# Patient Record
Sex: MALE | Race: WHITE | NOT HISPANIC OR LATINO | Employment: UNEMPLOYED | URBAN - METROPOLITAN AREA
[De-identification: names, ages, dates, MRNs, and addresses within clinical notes are randomized per-mention and may not be internally consistent; named-entity substitution may affect disease eponyms.]

---

## 2021-01-19 ENCOUNTER — HOSPITAL ENCOUNTER (OUTPATIENT)
Dept: FAMILY MEDICINE CLINIC | Facility: CLINIC | Age: 46
Discharge: HOME | End: 2021-01-19
Attending: FAMILY MEDICINE

## 2021-01-19 ENCOUNTER — OFFICE VISIT CONVERTED (OUTPATIENT)
Dept: FAMILY MEDICINE CLINIC | Facility: CLINIC | Age: 46
End: 2021-01-19
Attending: FAMILY MEDICINE

## 2021-01-19 LAB
ALBUMIN SERPL-MCNC: 4.5 G/DL (ref 3.5–5)
ALBUMIN/GLOB SERPL: 1.7 {RATIO} (ref 1.4–2.6)
ALP SERPL-CCNC: 79 U/L (ref 53–128)
ALT SERPL-CCNC: 9 U/L (ref 10–40)
ANION GAP SERPL CALC-SCNC: 15 MMOL/L (ref 8–19)
AST SERPL-CCNC: 16 U/L (ref 15–50)
BASOPHILS # BLD AUTO: 0.07 10*3/UL (ref 0–0.2)
BASOPHILS NFR BLD AUTO: 1.5 % (ref 0–3)
BILIRUB SERPL-MCNC: 0.3 MG/DL (ref 0.2–1.3)
BUN SERPL-MCNC: 8 MG/DL (ref 5–25)
BUN/CREAT SERPL: 7 {RATIO} (ref 6–20)
CALCIUM SERPL-MCNC: 9.1 MG/DL (ref 8.7–10.4)
CHLORIDE SERPL-SCNC: 101 MMOL/L (ref 99–111)
CHOLEST SERPL-MCNC: 186 MG/DL (ref 107–200)
CHOLEST/HDLC SERPL: 3.6 {RATIO} (ref 3–6)
CONV ABS IMM GRAN: 0.01 10*3/UL (ref 0–0.2)
CONV CO2: 25 MMOL/L (ref 22–32)
CONV IMMATURE GRAN: 0.2 % (ref 0–1.8)
CONV TOTAL PROTEIN: 7.2 G/DL (ref 6.3–8.2)
CREAT UR-MCNC: 1.09 MG/DL (ref 0.7–1.2)
DEPRECATED RDW RBC AUTO: 41.2 FL (ref 35.1–43.9)
EOSINOPHIL # BLD AUTO: 0.06 10*3/UL (ref 0–0.7)
EOSINOPHIL # BLD AUTO: 1.3 % (ref 0–7)
ERYTHROCYTE [DISTWIDTH] IN BLOOD BY AUTOMATED COUNT: 12.4 % (ref 11.6–14.4)
FOLATE SERPL-MCNC: 5.2 NG/ML (ref 4.8–20)
GFR SERPLBLD BASED ON 1.73 SQ M-ARVRAT: >60 ML/MIN/{1.73_M2}
GLOBULIN UR ELPH-MCNC: 2.7 G/DL (ref 2–3.5)
GLUCOSE SERPL-MCNC: 89 MG/DL (ref 70–99)
HCT VFR BLD AUTO: 44.9 % (ref 42–52)
HDLC SERPL-MCNC: 52 MG/DL (ref 40–60)
HGB BLD-MCNC: 15.1 G/DL (ref 14–18)
LDLC SERPL CALC-MCNC: 115 MG/DL (ref 70–100)
LYMPHOCYTES # BLD AUTO: 1.2 10*3/UL (ref 1–5)
LYMPHOCYTES NFR BLD AUTO: 26.2 % (ref 20–45)
MCH RBC QN AUTO: 30.4 PG (ref 27–31)
MCHC RBC AUTO-ENTMCNC: 33.6 G/DL (ref 33–37)
MCV RBC AUTO: 90.5 FL (ref 80–96)
MONOCYTES # BLD AUTO: 0.31 10*3/UL (ref 0.2–1.2)
MONOCYTES NFR BLD AUTO: 6.8 % (ref 3–10)
NEUTROPHILS # BLD AUTO: 2.93 10*3/UL (ref 2–8)
NEUTROPHILS NFR BLD AUTO: 64 % (ref 30–85)
NRBC CBCN: 0 % (ref 0–0.7)
OSMOLALITY SERPL CALC.SUM OF ELEC: 282 MOSM/KG (ref 273–304)
PLATELET # BLD AUTO: 232 10*3/UL (ref 130–400)
PMV BLD AUTO: 9.4 FL (ref 9.4–12.4)
POTASSIUM SERPL-SCNC: 4.4 MMOL/L (ref 3.5–5.3)
RBC # BLD AUTO: 4.96 10*6/UL (ref 4.7–6.1)
SODIUM SERPL-SCNC: 137 MMOL/L (ref 135–147)
T4 FREE SERPL-MCNC: 1.2 NG/DL (ref 0.9–1.8)
TRIGL SERPL-MCNC: 97 MG/DL (ref 40–150)
TSH SERPL-ACNC: 1.03 M[IU]/L (ref 0.27–4.2)
VIT B12 SERPL-MCNC: 265 PG/ML (ref 211–911)
VLDLC SERPL-MCNC: 19 MG/DL (ref 5–37)
WBC # BLD AUTO: 4.58 10*3/UL (ref 4.8–10.8)

## 2021-02-02 ENCOUNTER — OFFICE VISIT CONVERTED (OUTPATIENT)
Dept: OTOLARYNGOLOGY | Facility: CLINIC | Age: 46
End: 2021-02-02
Attending: NURSE PRACTITIONER

## 2021-02-04 ENCOUNTER — HOSPITAL ENCOUNTER (OUTPATIENT)
Dept: MRI IMAGING | Facility: HOSPITAL | Age: 46
Discharge: HOME | End: 2021-02-04
Attending: FAMILY MEDICINE

## 2021-02-09 ENCOUNTER — OFFICE VISIT CONVERTED (OUTPATIENT)
Dept: FAMILY MEDICINE CLINIC | Facility: CLINIC | Age: 46
End: 2021-02-09
Attending: FAMILY MEDICINE

## 2021-02-09 ENCOUNTER — HOSPITAL ENCOUNTER (OUTPATIENT)
Dept: FAMILY MEDICINE CLINIC | Facility: CLINIC | Age: 46
Discharge: HOME | End: 2021-02-09
Attending: FAMILY MEDICINE

## 2021-02-09 LAB
25(OH)D3 SERPL-MCNC: 12.2 NG/ML (ref 30–100)
ANION GAP SERPL CALC-SCNC: 14 MMOL/L (ref 8–19)
BASOPHILS # BLD AUTO: 0.1 10*3/UL (ref 0–0.2)
BASOPHILS NFR BLD AUTO: 1.6 % (ref 0–3)
BUN SERPL-MCNC: 6 MG/DL (ref 5–25)
BUN/CREAT SERPL: 6 {RATIO} (ref 6–20)
CALCIUM SERPL-MCNC: 8.9 MG/DL (ref 8.7–10.4)
CHLORIDE SERPL-SCNC: 103 MMOL/L (ref 99–111)
CONV ABS IMM GRAN: 0.01 10*3/UL (ref 0–0.2)
CONV CO2: 24 MMOL/L (ref 22–32)
CONV IMMATURE GRAN: 0.2 % (ref 0–1.8)
CREAT UR-MCNC: 1 MG/DL (ref 0.7–1.2)
DEPRECATED RDW RBC AUTO: 40.5 FL (ref 35.1–43.9)
EOSINOPHIL # BLD AUTO: 0.07 10*3/UL (ref 0–0.7)
EOSINOPHIL # BLD AUTO: 1.1 % (ref 0–7)
ERYTHROCYTE [DISTWIDTH] IN BLOOD BY AUTOMATED COUNT: 12.2 % (ref 11.6–14.4)
GFR SERPLBLD BASED ON 1.73 SQ M-ARVRAT: >60 ML/MIN/{1.73_M2}
GLUCOSE SERPL-MCNC: 99 MG/DL (ref 70–99)
HCT VFR BLD AUTO: 43.3 % (ref 42–52)
HGB BLD-MCNC: 14.9 G/DL (ref 14–18)
LYMPHOCYTES # BLD AUTO: 2.07 10*3/UL (ref 1–5)
LYMPHOCYTES NFR BLD AUTO: 33.3 % (ref 20–45)
MAGNESIUM SERPL-MCNC: 1.96 MG/DL (ref 1.6–2.3)
MCH RBC QN AUTO: 30.8 PG (ref 27–31)
MCHC RBC AUTO-ENTMCNC: 34.4 G/DL (ref 33–37)
MCV RBC AUTO: 89.5 FL (ref 80–96)
MONOCYTES # BLD AUTO: 0.28 10*3/UL (ref 0.2–1.2)
MONOCYTES NFR BLD AUTO: 4.5 % (ref 3–10)
NEUTROPHILS # BLD AUTO: 3.69 10*3/UL (ref 2–8)
NEUTROPHILS NFR BLD AUTO: 59.3 % (ref 30–85)
NRBC CBCN: 0 % (ref 0–0.7)
OSMOLALITY SERPL CALC.SUM OF ELEC: 282 MOSM/KG (ref 273–304)
PLATELET # BLD AUTO: 241 10*3/UL (ref 130–400)
PMV BLD AUTO: 9.2 FL (ref 9.4–12.4)
POTASSIUM SERPL-SCNC: 3.9 MMOL/L (ref 3.5–5.3)
RBC # BLD AUTO: 4.84 10*6/UL (ref 4.7–6.1)
SODIUM SERPL-SCNC: 137 MMOL/L (ref 135–147)
WBC # BLD AUTO: 6.22 10*3/UL (ref 4.8–10.8)

## 2021-02-25 ENCOUNTER — OFFICE VISIT CONVERTED (OUTPATIENT)
Dept: OTOLARYNGOLOGY | Facility: CLINIC | Age: 46
End: 2021-02-25
Attending: NURSE PRACTITIONER

## 2021-05-07 NOTE — PROGRESS NOTES
Progress Note      Patient Name: Alex Vázquez   Patient ID: [id number]   Sex: Male   YOB: 1975        Visit Date: February 9, 2021    Provider: Colin Dawkins MD   Location: [facility]   Location Address: [address] [place] [address] [place]  [address]   Location Phone: [phone]          Chief Complaint     mri follow up       History Of Present Illness  Alex Vázquez is a 45 year old /White male who presents for evaluation and treatment of:      pt is still having similar symptoms as previous visit- he has dizziness and vision changes  discussed labs  low vitamin B12  leukopenia  discussed MRI brain- small vessel disease only  pt did not get along with [facility] and he would like to start conservative treatment for low back pain  strongly urged pt again to stop smoking- pt says he smokes 1.5ppd       Past Medical History  Disease Name Date Onset Notes   Backache --  --          Past Surgical History  Procedure Name Date Notes   *No Past Surgical History --  --          Allergy List  Allergen Name Date Reaction Notes   NO KNOWN DRUG ALLERGIES --  --  --          Family Medical History  Disease Name Relative/Age Notes   *Adopted  --          Social History  Finding Status Start/Stop Quantity Notes   Alcohol Never --/-- --  --    Tobacco Current every day --/-- --  2 PKS DAY         Review of Systems  · Constitutional  o Denies  o : fatigue, fever  · HENT  o Admits  o : vertigo  · Cardiovascular  o Denies  o : chest pain, palpitations  · Respiratory  o Denies  o : shortness of breath, cough  · Musculoskeletal  o Admits  o : back pain      Vitals  Date Time BP Position Site L\R Cuff Size HR RR TEMP (F) WT  HT  BMI kg/m2 BSA m2 O2 Sat FR L/min FiO2 HC       02/09/2021 04:22 /70 Sitting    71 - R  96.8 146lbs 0oz    99 %            Physical Examination  · Constitutional  o Appearance  o : well developed, well-nourished, in no acute  distress  · Respiratory  o Respiratory Effort  o : breathing unlabored  o Auscultation of Lungs  o : clear to ascultation  · Cardiovascular  o Heart  o :   § Auscultation of Heart  § : regular rate and rhythm  o Peripheral Vascular System  o :   § Extremities  § : no edema  · Musculoskeletal  o General  o :   § General Musculoskeletal  § : No joint swelling or deformity., Muscle tone, strength, and development grossly normal. Bilateral lower back paraspinal muscle tenderness, neg straight leg raise, no vertebrae tenderness  · Neurologic  o Gait and Station  o :   § Gait Screening  § : normal gait  · Psychiatric  o Mood and Affect  o : mood normal, affect appropriate          Assessment  · Vitamin B12 deficiency     266.2/E53.8  · Hyperlipidemia     272.4/E78.5  · Low back pain     724.2/M54.5  · Dizziness     780.4/R42  · Vision changes     368.9/H53.9  · Leukopenia     288.50/D72.819  · Muscle spasm     728.85/M62.838  · Bone pain     733.90/M89.8X9      Plan  · Orders  o BMP [facility] (98030) - 728.85/M62.838 - 02/09/2021  o CBC with Auto Diff H (11240) - 288.50/D72.819 - 02/09/2021  o Vitamin D (25-Hydroxy) Level (94545) - 733.90/M89.8X9 - 02/09/2021  o ACO-39: Current medications updated and reviewed (1159F, ) - - 02/09/2021  o PHYSICAL THERAPY CONSULTATION (PHYTH) - 724.2/M54.5 - 02/09/2021  o NEUROLOGY CONSULTATION. (NEURO) - 780.4/R42, 368.9/H53.9 - 02/09/2021   no orapilla  o Magnesium level (52609) - 728.85/M62.838 - 02/09/2021  · Medications  o tizanidine 2 mg oral tablet   SIG: take 1 tablet by oral route 3 times a day as needed for 30 days   DISP: (90) Tablet with 1 refills  Prescribed on 02/09/2021     o cyanocobalamin (vitamin B-12) 1,000 mcg/mL injection solution   SIG: inject 1 milliliter (1,000 mcg) by intramuscular route once a month for 30 days   DISP: (1) Milliliter with 5 refills  Prescribed on 02/09/2021     o Medications have been Reconciled  o Transition of Care or Provider  Policy  · Instructions  o Advised that cheeses and other sources of dairy fats, animal fats, fast food, and the extras (candy, pasteries, pies, doughnuts and cookies) all contain LDL raising nutrients. Advised to increase fruits, vegetables, whole grains, and to monitor portion sizes.   o Patient was educated/instructed on their diagnosis, treatment and medications prior to discharge from the clinic today.            Electronically Signed by: Colin Dawkins MD -Author on February 9, 2021 05:00:10 PM

## 2021-05-07 NOTE — PROGRESS NOTES
Progress Note      Patient Name: Alex Vázquez   Patient ID: [id number]   Sex: Male   YOB: 1975        Visit Date: January 19, 2021    Provider: Colin Dawkins MD   Location: [facility] [place]   Location Address: [address] [place] [address] [place]  [address]   Location Phone: [phone]          Chief Complaint     New patient established. Back pain x two weeks. Hx of a back injury two years ago. Pt also c/o vision being blurry for two weeks. Also has chronic hearing issues he would like to discuss.       History Of Present Illness  Alex Vázquez is a 45 year old /White male who presents for evaluation and treatment of:      pt has chronic vertigo for multiple years and has dizziness that is becoming more frequent    pt has had worsening blurry vision over last 2 weeks- double vision intermittently    intermittent numbness in back of head x 2 weeks    chronic low back pain since it was crushed during accident at home fell down and hurt back 3 yrs ago- had brace for awhile- no bowel or bladder incontinence- pt had seen neurosurgeon at that time- was in brace for long period of time- no surgery- pain worsening- pt unable to  things off of table    pt urged to quit smoking    xrays:no fxs       Past Medical History  Disease Name Date Onset Notes   Backache --  --          Past Surgical History  Procedure Name Date Notes   *No Past Surgical History --  --          Allergy List  Allergen Name Date Reaction Notes   NO KNOWN DRUG ALLERGIES --  --  --          Family Medical History  Disease Name Relative/Age Notes   *Adopted  --          Social History  Finding Status Start/Stop Quantity Notes   Alcohol Never --/-- --  --    Tobacco Current every day --/-- --  2 PKS DAY         Review of Systems  · Constitutional  o Denies  o : fatigue, fever  · Eyes  o Admits  o : double vision, blurred vision  · Cardiovascular  o Denies  o : chest pain,  "palpitations  · Respiratory  o Denies  o : shortness of breath, cough  · Gastrointestinal  o Denies  o : nausea, vomiting, diarrhea  · Musculoskeletal  o Admits  o : back pain  · Psychiatric  o Denies  o : anxiety, depression      Vitals  Date Time BP Position Site L\R Cuff Size HR RR TEMP (F) WT  HT  BMI kg/m2 BSA m2 O2 Sat FR L/min FiO2 HC       01/19/2021 10:43 /60 Sitting    79 - R  96.8 145lbs 0oz 5'  8.5\" 21.73 1.78 96 %            Physical Examination  · Constitutional  o Appearance  o : well developed, well-nourished, in no acute distress  · Eyes  o Conjunctivae  o : conjunctivae normal  o Pupils and Irises  o : PERRLA, EOMI bilaterally  · Respiratory  o Respiratory Effort  o : breathing unlabored  o Auscultation of Lungs  o : clear to ascultation  · Cardiovascular  o Heart  o :   § Auscultation of Heart  § : regular rate and rhythm  o Peripheral Vascular System  o :   § Extremities  § : no edema  · Gastrointestinal  o Abdomen  o : soft, non-tender, non-distended, + bowel sounds, no hepatosplenomegaly, no masses palpated  · Musculoskeletal  o General  o :   § General Musculoskeletal  § : No joint swelling or deformity., Muscle tone, strength, and development grossly normal. Right lower back paraspinal muscle tenderness, neg straight leg raise, no vertebrae tenderness.  · Neurologic  o Gait and Station  o :   § Gait Screening  § : normal gait  · Psychiatric  o Mood and Affect  o : mood normal, affect appropriate          Assessment  · Vision blurring     368.8/H53.8  · Vertigo     780.4/R42  · Dizziness     780.4/R42  · Vision changes     368.9/H53.9  · Double vision     368.2/H53.2  · Numbness     782.0/R20.0  · Chronic low back pain       Low back pain     724.2/M54.5  Other chronic pain     724.2/G89.29  · Screening cholesterol level     V77.91/Z13.220      Plan  · Orders  o CBC with Auto Diff [facility] (09913) - 780.4/R42 - 01/19/2021  o CMP [facility] (43257) - 780.4/R42 - 01/19/2021  o Lipid Panel [facility] (90647) " - V77.91/Z13.220 - 01/20/2021  o Thyroid Profile (THYII) - 780.4/R42, 368.9/H53.9, 782.0/R20.0 - 01/19/2021  o ACO-39: Current medications updated and reviewed (1159F, ) - - 01/19/2021  o Vision screening (68849) - 368.8/H53.8 - 01/19/2021   B 20/20 LT 20/20 RT 20/40  o ENT CONSULTATION (ENTCO) - 780.4/R42 - 01/19/2021  o Bilateral duplex scan of carotid arteries (41536) - 780.4/R42 - 01/19/2021  o OPHTHALMOLOGY CONSULTATION (OPHTH) - 368.8/H53.8, 368.9/H53.9, 368.2/H53.2 - 01/19/2021  o MRI brain multi-sequence wo then w contrast (38731) - 780.4/R42, 368.9/H53.9, 368.2/H53.2, 782.0/R20.0 - 01/19/2021  o Vitamin B12 and folate measurement (90922, 17297) - 782.0/R20.0 - 01/19/2021  o NEUROSURGEON CONSULTATION (NEUSR) - 724.2/M54.5, 724.2/G89.29 - 01/19/2021   pt desiring to see [facility]  o Lumbar Spine AP and Lateral X-Ray [facility] (00036) - 724.2/M54.5, 724.2/G89.29 - 01/19/2021  · Medications  o Medications have been Reconciled  o Transition of Care or Provider Policy  · Instructions  o Patient was educated/instructed on their diagnosis, treatment and medications prior to discharge from the clinic today.            Electronically Signed by: Colin Dawkins MD -Author on January 19, 2021 11:56:50 AM

## 2021-05-09 VITALS
TEMPERATURE: 96.8 F | OXYGEN SATURATION: 96 % | WEIGHT: 145 LBS | DIASTOLIC BLOOD PRESSURE: 60 MMHG | HEIGHT: 68 IN | HEART RATE: 79 BPM | BODY MASS INDEX: 21.98 KG/M2 | SYSTOLIC BLOOD PRESSURE: 100 MMHG

## 2021-05-09 VITALS
HEART RATE: 71 BPM | WEIGHT: 146 LBS | OXYGEN SATURATION: 99 % | SYSTOLIC BLOOD PRESSURE: 116 MMHG | DIASTOLIC BLOOD PRESSURE: 70 MMHG | TEMPERATURE: 96.8 F

## 2021-05-10 NOTE — H&P
"   History and Physical      Patient Name: Alex Vázquez   Patient ID: [id number]   Sex: Male   YOB: 1975    Primary Care Provider: Colin Dawkins MD   Referring Provider: Colin Dawkins MD    Visit Date: February 2, 2021    Provider: RITU Khan   Location: [facility]   Location Address: [address] [place] [address], [address] [place] [address]  [id number]   Location Phone: [phone]          Chief Complaint     1. Vertigo    2. Hearing loss       History Of Present Illness  Alex Vázquez is a 45 year old /White male who presents to the office today as a consult from Colin Dawkins MD.      He presents to the clinic today for evaluation of vertigo and hearing loss.  He reports for many years he has had issues with vertigo.  He reports that he will go several days with having \"episodes\" of room spinning sensation, nausea and vomiting.  He states he will get a headache in the back of his head, a numbness sensation in the back of his head as well prior to having vertigo.  He states that he has issues with his spine and is scheduled to have an MRI in 2 days to further evaluate this.  He reports that typically the vertigo only lasts a few minutes and then resolves but will often return.  It is most always triggered by movement.  He states he has had some severe episodes while driving in the car and has had to pull over.  He states that he was previously seen a few years ago by an ENT in [place] who wanted to send him for vestibular testing.  He states that he did not go for this testing.  He reports that he has also had hearing loss for many years.  He feels like his hearing is worsening.  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  He has not had any issues with recurrent ear infections.       Past Medical History  Compression fracture of L2 lumbar vertebra; Decreased hearing; Dizziness         Past Surgical History  *No Past Surgical History         Allergy List  NO KNOWN " "DRUG ALLERGIES         Family Medical History  *No Known Family History         Social History  Tobacco (Current every day)         Review of Systems  · Constitutional  o Denies  o : fever, night sweats, weight loss  · Eyes  o Denies  o : discharge from eye, impaired vision  · HENT  o Admits  o : *See HPI  · Cardiovascular  o Denies  o : chest pain, irregular heart beats  · Respiratory  o Denies  o : shortness of breath, wheezing, coughing up blood  · Gastrointestinal  o Denies  o : heartburn, reflux, vomiting blood  · Genitourinary  o Denies  o : frequency  · Integument  o Denies  o : rash, skin dryness  · Neurologic  o Admits  o : loss of balance, dizziness  o Denies  o : seizures, loss of consciousness  · Endocrine  o Denies  o : cold intolerance, heat intolerance  · Heme-Lymph  o Denies  o : easy bleeding, anemia      Vitals  Date Time BP Position Site L\R Cuff Size HR RR TEMP (F) WT  HT  BMI kg/m2 BSA m2 O2 Sat FR L/min FiO2        02/02/2021 01:22 PM        97.4 146lbs 6oz 5'  6\" 23.63 1.76             Physical Examination  · Constitutional  o Appearance  o : well developed, well-nourished, alert and in no acute distress, voice clear and strong  · Head and Face  o Head  o :   § Inspection  § : no deformities or lesions  o Face  o :   § Inspection  § : No facial lesions; House-Brackmann I/VI bilaterally  § Palpation  § : No TMJ crepitus nor  muscle tenderness bilaterally  · Eyes  o Vision  o :   § Visual Fields  § : Extraocular movements are intact. No spontaneous or gaze-induced nystagmus.  o Conjunctivae  o : clear  o Sclerae  o : clear  o Pupils and Irises  o : pupils equal, round, and reactive to light.   · Ears, Nose, Mouth and Throat  o Ears  o :   § External Ears  § : appearance within normal limits, no lesions present  § Otoscopic Examination  § : tympanic membrane appearance within normal limits bilaterally without perforations, well-aerated middle ears  § Hearing  § : difficulty hearing " conversation  o Nose  o :   § External Nose  § : appearance normal  § Intranasal Exam  § : mucosa within normal limits, vestibules normal, no intranasal lesions present, septum midline, sinuses non tender to percussion  o Oral Cavity  o :   § Oral Mucosa  § : oral mucosa normal without pallor or cyanosis  § Lips  § : lip appearance normal  § Teeth  § : normal dentition for age  § Gums  § : gums pink, non-swollen, no bleeding present  § Tongue  § : tongue appearance normal; normal mobility  § Palate  § : hard palate normal, soft palate appearance normal with symmetric mobility  o Throat  o :   § Oropharynx  § : no inflammation or lesions present, tonsils within normal limits  § Hypopharynx  § : appearance within normal limits, superior epiglottis within normal limits  § Larynx  § : appearance within normal limits, vocal cords within normal limits, no lesions present  · Neck  o Inspection/Palpation  o : normal appearance, no masses or tenderness, trachea midline; thyroid size normal, nontender, no nodules or masses present on palpation  · Respiratory  o Respiratory Effort  o : breathing unlabored  o Inspection of Chest  o : normal appearance, no retractions  · Lymphatic  o Neck  o : no lymphadenopathy present  o Supraclavicular Nodes  o : no lymphadenopathy present  o Preauricular Nodes  o : no lymphadenopathy present  · Skin and Subcutaneous Tissue  o General Inspection  o : Regarding face and neck - there are no rashes present, no lesions present, and no areas of discoloration  · Neurologic  o Cranial Nerves  o : cranial nerves II-XII are grossly intact bilaterally  o Gait and Station  o : normal gait, able to stand without difficulty. Elsy-Hallpike maneuver negative bilaterally. Tandem gait normal.  · Psychiatric  o Judgement and Insight  o : judgment and insight intact  o Mood and Affect  o : mood normal, affect appropriate          Assessment  · Vertigo     780.4/R42  · Sensorineural hearing loss (SNHL),  "bilateral     389.18/H90.3      Plan  · Orders  o Audiometry, pure-tone (threshold); [facility] bone (07106) - 780.4/R42, 389.18/H90.3 - 02/02/2021  o Tympanogram (Impedance Testing) [facility] (07289) - 780.4/R42, 389.18/H90.3 - 02/02/2021  o Vestibular testing, bilateral, bithermal [facility] (09264) - 780.4/R42, 389.18/H90.3 - 02/02/2021  · Medications  o Medications have been Reconciled  o Transition of Care or Provider Policy  · Instructions  o He presents to the clinic today for evaluation of vertigo and hearing loss. He reports for many years he has had issues with vertigo. He reports that he will go several days with having \"episodes\" of room spinning sensation, nausea and vomiting. He states he will get a headache in the back of his head, a numbness sensation in the back of his head as well prior to having vertigo. He states that he has issues with his spine and is scheduled to have an MRI in 2 days to further evaluate this. He reports that typically the vertigo only lasts a few minutes and then resolves but will often return. It is most always triggered by movement. He states he has had some severe episodes while driving in the car and has had to pull over. He states that he was previously seen a few years ago by an ENT in [place] who wanted to send him for vestibular testing. He states that he did not go for this testing. He reports that he has also had hearing loss for many years. He feels like his hearing is worsening. 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 He has not had any issues with recurrent ear infections. On examination today bilateral external auditory canals and bilateral tympanic membrane appearance is normal. There are no perforations and middle ears are well aerated. Elsy-Hallpike maneuver is negative bilaterally. His gait is steady and tandem gait is normal. We did obtain an audiogram and tympanogram. Audiogram shows the right ear with a flat moderately severe sensorineural hearing loss. The " left ear has an essentially flat moderately severe to moderate sensorineural hearing loss. Speech reception threshold was at 55 dB on the right and 50 dB on the left. Word discrimination scores were 48% on the right and 52% on the left at 85 dB. Tympanograms were normal bilaterally. I have gone over the results of the audiogram with the patient and his wife and also given them a copy. He would get some benefit from amplification we have discussed this. And concern with his vertigo we have discussed the possibility of ordering vestibular testing for further evaluation of his symptoms and he is agreeable to this. I will plan to see him back after the testing.  o Electronically Identified Patient Education Materials Provided Electronically  · Correspondence  o ENT Letter to Referring MD (Colin Dawkins MD) - 02/02/2021            Electronically Signed by: RITU Khan -Author on February 2, 2021 02:40:56 PM

## 2021-05-14 VITALS — WEIGHT: 146.37 LBS | BODY MASS INDEX: 23.52 KG/M2 | TEMPERATURE: 97.4 F | HEIGHT: 66 IN

## 2021-05-14 VITALS — WEIGHT: 146.37 LBS | HEIGHT: 66 IN | BODY MASS INDEX: 23.52 KG/M2 | TEMPERATURE: 97.3 F

## 2021-05-14 NOTE — PROGRESS NOTES
Progress Note      Patient Name: Alex Vázquez   Patient ID: [id number]   Sex: Male   YOB: 1975    Primary Care Provider: Colin Dawkins MD   Referring Provider: Colin Dawkins MD    Visit Date: February 25, 2021    Provider: RITU Khan   Location: [facility]   Location Address: [address] [place] [address], [address] [place] [address]  [id number]   Location Phone: [phone]          Chief Complaint     1.  Vertigo    2.  Hearing loss       History Of Present Illness  Alex Vázquez is a 45 year old /White male who presents to the office today for a follow-up visit.      He presents to the clinic today for follow-up regarding vertigo and hearing loss.  He recently had video nystagmography testing performed and is here for the results.  He reports that he has been doing fairly well.  He has not had any episodes of vertigo but does frequently feel off balance.  When he was last seen on 2/2/2021 an audiogram showed that the right ear had a flat moderately severe sensorineural hearing loss.  The left ear had an essentially flat moderately severe to moderate sensorineural hearing loss.  Tympanograms were normal bilaterally.  Word discrimination scores were 48% on the right and 52% on the left at 85 dB.  Results of the VNG testing show findings would suggest an older peripheral dysfunction likely of the right vestibular system.  Recommendation is vestibular therapy may be of benefit.       Past Medical History  Compression fracture of L2 lumbar vertebra; Decreased hearing; Dizziness         Past Surgical History  *No Past Surgical History         Allergy List  NO KNOWN DRUG ALLERGIES         Family Medical History  *No Known Family History         Social History  Tobacco (Current every day)         Review of Systems  · Constitutional  o Denies  o : fever, night sweats, weight loss  · Eyes  o Denies  o : discharge from eye, impaired vision  · HENT  o Admits  o : *See  "HPI  · Cardiovascular  o Denies  o : chest pain, irregular heart beats  · Respiratory  o Denies  o : shortness of breath, wheezing, coughing up blood  · Gastrointestinal  o Denies  o : heartburn, reflux, vomiting blood  · Genitourinary  o Denies  o : frequency  · Integument  o Denies  o : rash, skin dryness  · Neurologic  o Denies  o : seizures, loss of balance, loss of consciousness, dizziness  · Endocrine  o Denies  o : cold intolerance, heat intolerance  · Heme-Lymph  o Denies  o : easy bleeding, anemia      Vitals  Date Time BP Position Site L\R Cuff Size HR RR TEMP (F) WT  HT  BMI kg/m2 BSA m2 O2 Sat FR L/min FiO2 HC       02/25/2021 10:36 AM        97.3 146lbs 6oz 5'  6\" 23.63 1.76             Physical Examination  · Constitutional  o Appearance  o : well developed, well-nourished, alert and in no acute distress, voice clear and strong  · Head and Face  o Head  o :   § Inspection  § : no deformities or lesions  o Face  o :   § Inspection  § : No facial lesions; House-Brackmann I/VI bilaterally  § Palpation  § : No TMJ crepitus nor  muscle tenderness bilaterally  · Eyes  o Vision  o :   § Visual Fields  § : Extraocular movements are intact. No spontaneous or gaze-induced nystagmus.  o Conjunctivae  o : clear  o Sclerae  o : clear  o Pupils and Irises  o : pupils equal, round, and reactive to light.   · Ears, Nose, Mouth and Throat  o Ears  o :   § External Ears  § : appearance within normal limits, no lesions present  § Otoscopic Examination  § : tympanic membrane appearance within normal limits bilaterally without perforations, well-aerated middle ears  § Hearing  § : intact to conversational voice both ears  o Nose  o :   § External Nose  § : appearance normal  § Intranasal Exam  § : mucosa within normal limits, vestibules normal, no intranasal lesions present, septum midline, sinuses non tender to percussion  o Oral Cavity  o :   § Oral Mucosa  § : oral mucosa normal without pallor or " cyanosis  § Lips  § : lip appearance normal  § Teeth  § : normal dentition for age  § Gums  § : gums pink, non-swollen, no bleeding present  § Tongue  § : tongue appearance normal; normal mobility  § Palate  § : hard palate normal, soft palate appearance normal with symmetric mobility  o Throat  o :   § Oropharynx  § : no inflammation or lesions present, tonsils within normal limits  · Neck  o Inspection/Palpation  o : normal appearance, no masses or tenderness, trachea midline; thyroid size normal, nontender, no nodules or masses present on palpation  · Respiratory  o Respiratory Effort  o : breathing unlabored  o Inspection of Chest  o : normal appearance, no retractions  · Lymphatic  o Neck  o : no lymphadenopathy present  o Supraclavicular Nodes  o : no lymphadenopathy present  o Preauricular Nodes  o : no lymphadenopathy present  · Skin and Subcutaneous Tissue  o General Inspection  o : Regarding face and neck - there are no rashes present, no lesions present, and no areas of discoloration  · Neurologic  o Cranial Nerves  o : cranial nerves II-XII are grossly intact bilaterally  o Gait and Station  o : normal gait, able to stand without diffculty  · Psychiatric  o Judgement and Insight  o : judgment and insight intact  o Mood and Affect  o : mood normal, affect appropriate          Assessment  · Vertigo     780.4/R42  · Peripheral vestibulopathy of right ear     386.12/H81.91      Plan  · Orders  o Vestibular Rehabilitation Therapy ([facility]) - 386.12/H81.91, 780.4/R42 - 02/25/2021  · Medications  o Medications have been Reconciled  o Transition of Care or Provider Policy  · Instructions  o He presents to the clinic today for follow-up regarding vertigo and hearing loss. He recently had video nystagmography testing performed and is here for the results. He reports that he has been doing fairly well. He has not had any episodes of vertigo but does frequently feel off balance. When he was last seen on 2/2/2021 an  audiogram showed that the right ear had a flat moderately severe sensorineural hearing loss. The left ear had an essentially flat moderately severe to moderate sensorineural hearing loss. Tympanograms were normal bilaterally. Word discrimination scores were 48% on the right and 52% on the left at 85 dB. Results of the VNG testing show findings would suggest an older peripheral dysfunction likely of the right vestibular system. Recommendation is vestibular therapy may be of benefit. I will get him set up for vestibular therapy through physical therapy and I will plan to see him back in 1 year for repeat audiogram.  o Electronically Identified Patient Education Materials Provided Electronically            Electronically Signed by: RITU Khan -Author on February 25, 2021 11:06:23 AM

## 2021-09-17 ENCOUNTER — TELEPHONE (OUTPATIENT)
Dept: FAMILY MEDICINE CLINIC | Facility: CLINIC | Age: 46
End: 2021-09-17

## 2021-09-17 ENCOUNTER — CLINICAL SUPPORT (OUTPATIENT)
Dept: FAMILY MEDICINE CLINIC | Facility: CLINIC | Age: 46
End: 2021-09-17

## 2021-09-17 DIAGNOSIS — Z20.822 EXPOSURE TO 2019 NOVEL CORONAVIRUS: Primary | ICD-10-CM

## 2021-09-17 PROCEDURE — 87635 SARS-COV-2 COVID-19 AMP PRB: CPT | Performed by: FAMILY MEDICINE

## 2021-09-17 NOTE — TELEPHONE ENCOUNTER
Caller: Dee Vázquezry    Relationship: Self    Best call back number: [phone]    What is the best time to reach you: ANYTIME    Who are you requesting to speak with (clinical staff, provider,  specific staff member): SCHEDULING    What was the call regarding: THE PATIENT IS REQUESTING TO COME IN FOR A COVID TEST.  THE PATIENT CURRENTLY HAS NO SYMPTOMS BUT HAD BEEN IN CONTACT WITH TWO CHILDREN WHO HAD TESTED POSITIVE.     Do you require a callback: YES, PLEASE CALL AND ADVISE.

## 2021-09-18 LAB — SARS-COV-2 N GENE RESP QL NAA+PROBE: NOT DETECTED
